# Patient Record
Sex: FEMALE | Race: WHITE | NOT HISPANIC OR LATINO | Employment: OTHER | URBAN - METROPOLITAN AREA
[De-identification: names, ages, dates, MRNs, and addresses within clinical notes are randomized per-mention and may not be internally consistent; named-entity substitution may affect disease eponyms.]

---

## 2019-09-05 ENCOUNTER — TELEPHONE (OUTPATIENT)
Dept: VASCULAR SURGERY | Facility: CLINIC | Age: 59
End: 2019-09-05

## 2019-09-05 NOTE — TELEPHONE ENCOUNTER
Left message for the patient to call back so we can update her Address so I can send out new patient paper work to the patient

## 2019-09-12 ENCOUNTER — TELEPHONE (OUTPATIENT)
Dept: VASCULAR SURGERY | Facility: CLINIC | Age: 59
End: 2019-09-12

## 2019-09-12 NOTE — TELEPHONE ENCOUNTER
Left message for patient to call back to finish registration  Cyndi Cazares Has patient been referred by another doctor(amb ref)?   Patient also needs insurance referral for [facility]!!

## 2019-09-13 ENCOUNTER — TRANSCRIBE ORDERS (OUTPATIENT)
Dept: VASCULAR SURGERY | Facility: CLINIC | Age: 59
End: 2019-09-13

## 2019-09-13 DIAGNOSIS — I71.4 ABDOMINAL AORTIC ANEURYSM WITHOUT RUPTURE (HCC): Primary | ICD-10-CM

## 2019-09-17 RX ORDER — LEVOTHYROXINE SODIUM 0.07 MG/1
TABLET ORAL
Refills: 0 | COMMUNITY
Start: 2019-06-30

## 2019-09-17 RX ORDER — OMEPRAZOLE 20 MG/1
CAPSULE, DELAYED RELEASE ORAL
Refills: 1 | COMMUNITY
Start: 2019-08-18

## 2019-09-17 RX ORDER — AMLODIPINE BESYLATE 5 MG/1
5 TABLET ORAL DAILY
Refills: 1 | COMMUNITY
Start: 2019-06-17

## 2019-09-18 NOTE — PROGRESS NOTES
Assessment/Plan:    AAA (abdominal aortic aneurysm) without rupture ([facility])  2 9 cm saccular infrarenal abdominal aortic aneurysm  We discussed the findings on CT scan along with the pathophysiology of aneurysmal disease, the indications for treatment and the treatment options available  At this point no further intervention is necessary other than annual duplex evaluation    Aortoiliac occlusive disease ([address] )  Aortoiliac occlusive disease with mild calf claudication more severe on the left as compared to the right  We discussed the finding of a distal aortic stenosis along with the indications for treatment and the treatment options available  Since her claudication symptoms are only mild and do not create any significant limitation in her activity at this point I would not advise further intervention other than continued medical management with risk factor modification and annual duplex follow-up which will be performed at the same time as the aneurysm follow-up  Right carotid bruit  Right carotid artery bruit  We discussed this finding along with the importance of evaluation to rule out significant carotid occlusive disease  A carotid duplex will be set up in the near future  Smoking  Cigarette smoking  We discussed the relationship between cigarette smoking, arterial occlusive disease and aneurysmal disease along with the importance of smoking cessation  Diagnoses and all orders for this visit:    AAA (abdominal aortic aneurysm) without rupture ([facility] )  -     VAS abdominal aorta/iliacs; complete study; Future    Abdominal aortic aneurysm without rupture ([facility] )  -     Ambulatory referral to Vascular Surgery    Aortoiliac occlusive disease ([address] )  -     VAS abdominal aorta/iliacs; complete study; Future  -     VAS lower limb arterial duplex, complete bilateral; Future    Right carotid bruit  -     VAS carotid complete study;  Future    Smoking    Other orders  -     amLODIPine (NORVASC) 5 mg tablet; Take 5 mg by mouth daily  -     levothyroxine 75 mcg tablet; TAKE 1 TABLET BY MOUTH ONCE DAILY NEED AN APPOINTMENT BEFORE NEXT REFILL  -     omeprazole (PriLOSEC) 20 mg delayed release capsule; TAKE 1 CAPSULE BY MOUTH TWICE DAILY FOR 30 DAYS  -     doxylamine (UNISON) 25 MG tablet; Take by mouth  -     fluticasone (FLONASE) 50 mcg/act nasal spray  -     loratadine (CLARITIN) 10 mg tablet; Take by mouth  -     omeprazole (PriLOSEC OTC) 20 MG tablet; Take 20 mg by mouth  -     aspirin (ECOTRIN LOW STRENGTH) 81 mg EC tablet; Take 81 mg by mouth daily          Subjective:      Patient ID: Lianne Correa is a 60 y o  female  Pt is new to our practice and was referred by Dr Jose Reddy MD for evaluation of AAA  Pt denies any abdominal or back pain  Pt denies any nausea or vomiting after eating  Pt c/o bilateral leg pain with walking 1 mile  Pt then uses "Stop Pain" to help relieve the pain  Pt states this leg pain has been going on for over 1 year  Pt had a CTA of the Abdomen on 8/5/19     11-year-old presents for evaluation of abdominal aortic aneurysm and claudication  Patient was noted on recent medical evaluation when she was initially evaluated by a new family practice physician to have an abdominal aortic aneurysm  Subsequent imaging included CT angiography with the finding of a 2 9 cm saccular aneurysm involving the infrarenal aorta  Also of note was a significant stenosis of the distal abdominal aorta with residual lumen of 4-5 mm  On evaluation today she states she is doing well and her only complaint is of some mild calf claudication more severe on the left as compared to the right  She states she is able to walk approximately 1 mi before she has to stop  This then resolves with a short period of rest   This does not impact her ability to perform her daily activities  She notes no abdominal or back pain  She denies any family history of aneurysm disease    She does smoke 1/2 pack of cigarettes per day  CT angiogram 08/05/2019 with findings as noted above  Lower extremity arterial duplex again from outside facility shows no evidence of focal stenosis within the infrainguinal arterial segments  The following portions of the patient's history were reviewed and updated as appropriate: allergies, current medications, past family history, past medical history, past social history, past surgical history and problem list     The ROS as rashid was reviewed and changes made as indictated       Review of Systems   Constitutional: Negative  HENT: Positive for sinus pressure  Eyes: Negative  Respiratory: Negative  Cardiovascular: Negative  Gastrointestinal: Negative  Endocrine: Negative  Genitourinary: Negative  Musculoskeletal:        Leg pain with walking   Skin: Negative  Allergic/Immunologic: Positive for environmental allergies  Neurological: Negative  Hematological: Bruises/bleeds easily  Psychiatric/Behavioral: Negative  Objective:      /74 (BP Location: Left arm, Patient Position: Sitting, Cuff Size: Adult)   Pulse 78   Temp 98 8 °F (37 1 °C) (Tympanic)   Resp 16   Ht 5' 1" (1 549 m)   Wt 61 2 kg (135 lb)   BMI 25 51 kg/m²          Physical Exam   Constitutional: She is oriented to person, place, and time  She appears well-developed and well-nourished  HENT:   Head: Normocephalic and atraumatic  Eyes: Conjunctivae and EOM are normal    Neck: Normal range of motion  Neck supple  No JVD present  Carotid bruit is present (Right carotid bruit)  Cardiovascular: Normal rate, regular rhythm, S1 normal, S2 normal and normal heart sounds  No murmur heard  Pulses:       Carotid pulses are 2+ on the right side with bruit, and 2+ on the left side  Radial pulses are 2+ on the right side, and 2+ on the left side  Femoral pulses are 1+ on the right side, and 1+ on the left side         Popliteal pulses are 1+ on the right side, and 1+ on the left side  Dorsalis pedis pulses are 2+ on the right side, and 2+ on the left side  Posterior tibial pulses are 2+ on the right side, and 2+ on the left side  Pulmonary/Chest: Effort normal and breath sounds normal    Abdominal: Soft  Normal appearance and normal aorta  She exhibits no abdominal bruit and no pulsatile midline mass  There is no tenderness  No hernia  Musculoskeletal: Normal range of motion  She exhibits no edema, tenderness or deformity  Neurological: She is alert and oriented to person, place, and time  She has normal strength  No sensory deficit  Skin: Skin is warm, dry and intact  No pallor  Psychiatric: She has a normal mood and affect   Her speech is normal and behavior is normal

## 2019-09-19 ENCOUNTER — CONSULT (OUTPATIENT)
Dept: VASCULAR SURGERY | Facility: CLINIC | Age: 59
End: 2019-09-19
Payer: COMMERCIAL

## 2019-09-19 VITALS
RESPIRATION RATE: 16 BRPM | DIASTOLIC BLOOD PRESSURE: 74 MMHG | WEIGHT: 135 LBS | SYSTOLIC BLOOD PRESSURE: 130 MMHG | HEART RATE: 78 BPM | BODY MASS INDEX: 25.49 KG/M2 | HEIGHT: 61 IN | TEMPERATURE: 98.8 F

## 2019-09-19 DIAGNOSIS — F17.200 SMOKING: Chronic | ICD-10-CM

## 2019-09-19 DIAGNOSIS — I74.09 AORTOILIAC OCCLUSIVE DISEASE (HCC): Chronic | ICD-10-CM

## 2019-09-19 DIAGNOSIS — R09.89 RIGHT CAROTID BRUIT: Chronic | ICD-10-CM

## 2019-09-19 DIAGNOSIS — I71.4 AAA (ABDOMINAL AORTIC ANEURYSM) WITHOUT RUPTURE (HCC): Primary | Chronic | ICD-10-CM

## 2019-09-19 DIAGNOSIS — I71.4 ABDOMINAL AORTIC ANEURYSM WITHOUT RUPTURE (HCC): ICD-10-CM

## 2019-09-19 PROCEDURE — 99245 OFF/OP CONSLTJ NEW/EST HI 55: CPT | Performed by: SURGERY

## 2019-09-19 RX ORDER — OMEPRAZOLE 20 MG/1
20 TABLET, DELAYED RELEASE ORAL
COMMUNITY
Start: 2019-03-01

## 2019-09-19 RX ORDER — FLUTICASONE PROPIONATE 50 MCG
SPRAY, SUSPENSION (ML) NASAL
COMMUNITY
Start: 2017-01-30

## 2019-09-19 RX ORDER — LORATADINE 10 MG/1
TABLET ORAL
COMMUNITY

## 2019-09-19 RX ORDER — ASPIRIN 81 MG/1
81 TABLET ORAL DAILY
COMMUNITY

## 2019-09-19 NOTE — ASSESSMENT & PLAN NOTE
Aortoiliac occlusive disease with mild calf claudication more severe on the left as compared to the right  We discussed the finding of a distal aortic stenosis along with the indications for treatment and the treatment options available  Since her claudication symptoms are only mild and do not create any significant limitation in her activity at this point I would not advise further intervention other than continued medical management with risk factor modification and annual duplex follow-up which will be performed at the same time as the aneurysm follow-up

## 2019-09-19 NOTE — ASSESSMENT & PLAN NOTE
2 9 cm saccular infrarenal abdominal aortic aneurysm  We discussed the findings on CT scan along with the pathophysiology of aneurysmal disease, the indications for treatment and the treatment options available    At this point no further intervention is necessary other than annual duplex evaluation

## 2019-09-19 NOTE — LETTER
September 19, 2019     Leslie Fajardo, [id number]  [facility] [id number]    Patient: Vicky Abbott   YOB: 1960   Date of Visit: 9/19/2019       Dear Dr Shun Ferrara: Thank you for referring Vicky Abbott to me for evaluation  Below are the relevant portions of my assessment and plan of care  Diagnoses and all orders for this visit:    AAA (abdominal aortic aneurysm) without rupture ([facility])  2 9 cm saccular infrarenal abdominal aortic aneurysm  We discussed the findings on CT scan along with the pathophysiology of aneurysmal disease, the indications for treatment and the treatment options available  At this point no further intervention is necessary other than annual duplex evaluation    Aortoiliac occlusive disease ([address] )  Aortoiliac occlusive disease with mild calf claudication more severe on the left as compared to the right  We discussed the finding of a distal aortic stenosis along with the indications for treatment and the treatment options available  Since her claudication symptoms are only mild and do not create any significant limitation in her activity at this point I would not advise further intervention other than continued medical management with risk factor modification and annual duplex follow-up which will be performed at the same time as the aneurysm follow-up  Right carotid bruit  Right carotid artery bruit  We discussed this finding along with the importance of evaluation to rule out significant carotid occlusive disease  A carotid duplex will be set up in the near future  Smoking  Cigarette smoking  We discussed the relationship between cigarette smoking, arterial occlusive disease and aneurysmal disease along with the importance of smoking cessation  If you have questions, please do not hesitate to call me  I look forward to following Yvonne along with you           Sincerely,        Oliva Blackwell MD        CC: No Recipients

## 2019-09-19 NOTE — ASSESSMENT & PLAN NOTE
Right carotid artery bruit  We discussed this finding along with the importance of evaluation to rule out significant carotid occlusive disease  A carotid duplex will be set up in the near future

## 2019-09-19 NOTE — PATIENT INSTRUCTIONS
AAA (abdominal aortic aneurysm) without rupture ([facility])  2 9 cm saccular infrarenal abdominal aortic aneurysm  We discussed the findings on CT scan along with the pathophysiology of aneurysmal disease, the indications for treatment and the treatment options available  At this point no further intervention is necessary other than annual duplex evaluation    Aortoiliac occlusive disease ([facility] [address] )  Aortoiliac occlusive disease with mild calf claudication more severe on the left as compared to the right  We discussed the finding of a distal aortic stenosis along with the indications for treatment and the treatment options available  Since her claudication symptoms are only mild and do not create any significant limitation in her activity at this point I would not advise further intervention other than continued medical management with risk factor modification and annual duplex follow-up which will be performed at the same time as the aneurysm follow-up  Right carotid bruit  Right carotid artery bruit  We discussed this finding along with the importance of evaluation to rule out significant carotid occlusive disease  A carotid duplex will be set up in the near future  Smoking  Cigarette smoking  We discussed the relationship between cigarette smoking, arterial occlusive disease and aneurysmal disease along with the importance of smoking cessation

## 2019-09-19 NOTE — ASSESSMENT & PLAN NOTE
Cigarette smoking  We discussed the relationship between cigarette smoking, arterial occlusive disease and aneurysmal disease along with the importance of smoking cessation

## 2019-10-10 ENCOUNTER — HOSPITAL ENCOUNTER (OUTPATIENT)
Dept: NON INVASIVE DIAGNOSTICS | Facility: HOSPITAL | Age: 59
Discharge: HOME | End: 2019-10-10
Attending: SURGERY
Payer: COMMERCIAL

## 2019-10-10 DIAGNOSIS — R09.89 RIGHT CAROTID BRUIT: Chronic | ICD-10-CM

## 2019-10-10 PROCEDURE — 93880 EXTRACRANIAL BILAT STUDY: CPT | Performed by: SURGERY

## 2019-10-10 PROCEDURE — 93880 EXTRACRANIAL BILAT STUDY: CPT

## 2020-06-24 DIAGNOSIS — I65.23 CAROTID STENOSIS, BILATERAL: Primary | ICD-10-CM

## 2020-07-20 ENCOUNTER — TELEPHONE (OUTPATIENT)
Dept: ADMINISTRATIVE | Facility: HOSPITAL | Age: 60
End: 2020-07-20

## 2020-07-30 NOTE — TELEPHONE ENCOUNTER
Pt cancelled all dopplers, she is going to get all of them done closer to her home   She will contact us if anything needs to be done with [facility]